# Patient Record
Sex: MALE | Race: WHITE | NOT HISPANIC OR LATINO | URBAN - METROPOLITAN AREA
[De-identification: names, ages, dates, MRNs, and addresses within clinical notes are randomized per-mention and may not be internally consistent; named-entity substitution may affect disease eponyms.]

---

## 2019-08-13 ENCOUNTER — OFFICE (OUTPATIENT)
Dept: URBAN - METROPOLITAN AREA CLINIC 11 | Facility: CLINIC | Age: 39
End: 2019-08-13
Payer: COMMERCIAL

## 2019-08-13 VITALS
WEIGHT: 294 LBS | HEIGHT: 73 IN | DIASTOLIC BLOOD PRESSURE: 81 MMHG | SYSTOLIC BLOOD PRESSURE: 127 MMHG | HEART RATE: 80 BPM

## 2019-08-13 DIAGNOSIS — R12 HEARTBURN: ICD-10-CM

## 2019-08-13 DIAGNOSIS — R14.0 ABDOMINAL DISTENSION (GASEOUS): ICD-10-CM

## 2019-08-13 DIAGNOSIS — R19.4 CHANGE IN BOWEL HABIT: ICD-10-CM

## 2019-08-13 DIAGNOSIS — K76.0 FATTY (CHANGE OF) LIVER, NOT ELSEWHERE CLASSIFIED: ICD-10-CM

## 2019-08-13 DIAGNOSIS — R10.84 GENERALIZED ABDOMINAL PAIN: ICD-10-CM

## 2019-08-13 PROCEDURE — 99203 OFFICE O/P NEW LOW 30 MIN: CPT

## 2019-08-13 NOTE — SERVICEHPINOTES
38-year-old white male with several month history of increased abdominal bloating, eructation, abdominal pain. For the past few weeks, it has been more severe. The bloating progresses throughout the day, and is associated with diffuse abdominal pain. The abdominal pain can be so severe that it "feels like my insides are exploding" and could also radiate to his low back. The pain will have mild worsening with food and mild improvement with a bowel movement. He had one episode of severe pain with associated diaphoresis and nausea that resulted in a panic attack and resulted in an ER visit. He was prescribed dicyclomine with worsening of discomfort. He tried Gas-X and Pepto-Bismol with equivocal response. There is also a change in bowel pattern from 2-3 formed bowel movements daily to 4-5 bowel movements per day with less volume and be unformed.  Ultrasound was done 7/2/19 that showed fatty liver but otherwise was normal. HIDA was done 7/3/19 that was normal with an EF of 77%. CMP revealed elevated AST to 48 and ALT to 52. His fasting glucose was normal. Amylase, Lipase, CBC, and urinalysis were normal. He also has daily pyrosis and occasional regurgitation and eructation. He denies dysphagia. He started pantoprazole 40 mg  about 1.5 months ago with equivocal response. He denies dysphagia or odynophagia.With respect to fatty liver, he does not currently exercise. His diet is significant for fast food, restaurant food, and meals of convenience. He has significant alcohol intake of 6 beers per day, most days of the week. He is aware he needs to lose weight.

## 2019-08-13 NOTE — SERVICENOTES
Increased bloating and abdominal pain. Differential includes small intestinal bacterial overgrowth, irritable bowel syndrome, or less likely lactose malabsorption as he seldomly consumes dairy. There may be a component of aerophagia. The patient was previously not taking the Gas-X on a scheduled or consistent basis, so he will do an empiric trial of scheduled Gas-X four times a day. Lactulose Breath test scheduled for 8/21/19.

## 2019-08-21 ENCOUNTER — OFFICE (OUTPATIENT)
Dept: URBAN - METROPOLITAN AREA CLINIC 14 | Facility: CLINIC | Age: 39
End: 2019-08-21
Payer: COMMERCIAL

## 2019-08-21 DIAGNOSIS — R14.0 ABDOMINAL DISTENSION (GASEOUS): ICD-10-CM

## 2019-08-21 PROCEDURE — 91065 BREATH HYDROGEN/METHANE TEST: CPT

## 2019-08-22 ENCOUNTER — AMBULATORY SURGICAL CENTER (OUTPATIENT)
Dept: URBAN - METROPOLITAN AREA SURGERY 3 | Facility: SURGERY | Age: 39
End: 2019-08-22
Payer: COMMERCIAL

## 2019-08-22 ENCOUNTER — OFFICE (OUTPATIENT)
Dept: URBAN - METROPOLITAN AREA PATHOLOGY 22 | Facility: PATHOLOGY | Age: 39
End: 2019-08-22
Payer: COMMERCIAL

## 2019-08-22 ENCOUNTER — AMBULATORY SURGICAL CENTER (OUTPATIENT)
Dept: URBAN - METROPOLITAN AREA SURGERY 3 | Facility: SURGERY | Age: 39
End: 2019-08-22

## 2019-08-22 VITALS
DIASTOLIC BLOOD PRESSURE: 77 MMHG | SYSTOLIC BLOOD PRESSURE: 126 MMHG | SYSTOLIC BLOOD PRESSURE: 146 MMHG | RESPIRATION RATE: 18 BRPM | SYSTOLIC BLOOD PRESSURE: 118 MMHG | OXYGEN SATURATION: 93 % | SYSTOLIC BLOOD PRESSURE: 135 MMHG | OXYGEN SATURATION: 96 % | DIASTOLIC BLOOD PRESSURE: 71 MMHG | OXYGEN SATURATION: 98 % | WEIGHT: 290 LBS | RESPIRATION RATE: 21 BRPM | HEART RATE: 99 BPM | TEMPERATURE: 98.7 F | DIASTOLIC BLOOD PRESSURE: 96 MMHG | RESPIRATION RATE: 16 BRPM | SYSTOLIC BLOOD PRESSURE: 139 MMHG | HEART RATE: 98 BPM | HEART RATE: 88 BPM | RESPIRATION RATE: 16 BRPM | HEART RATE: 92 BPM | OXYGEN SATURATION: 98 % | DIASTOLIC BLOOD PRESSURE: 96 MMHG | DIASTOLIC BLOOD PRESSURE: 76 MMHG | SYSTOLIC BLOOD PRESSURE: 126 MMHG | HEART RATE: 92 BPM | SYSTOLIC BLOOD PRESSURE: 118 MMHG | SYSTOLIC BLOOD PRESSURE: 146 MMHG | RESPIRATION RATE: 21 BRPM | OXYGEN SATURATION: 98 % | DIASTOLIC BLOOD PRESSURE: 71 MMHG | OXYGEN SATURATION: 93 % | OXYGEN SATURATION: 96 % | OXYGEN SATURATION: 93 % | SYSTOLIC BLOOD PRESSURE: 118 MMHG | DIASTOLIC BLOOD PRESSURE: 96 MMHG | HEART RATE: 92 BPM | HEART RATE: 98 BPM | WEIGHT: 290 LBS | TEMPERATURE: 98.7 F | TEMPERATURE: 98.7 F | RESPIRATION RATE: 18 BRPM | RESPIRATION RATE: 24 BRPM | OXYGEN SATURATION: 92 % | SYSTOLIC BLOOD PRESSURE: 139 MMHG | WEIGHT: 290 LBS | HEART RATE: 88 BPM | DIASTOLIC BLOOD PRESSURE: 71 MMHG | OXYGEN SATURATION: 92 % | HEIGHT: 73 IN | TEMPERATURE: 98.1 F | RESPIRATION RATE: 24 BRPM | DIASTOLIC BLOOD PRESSURE: 76 MMHG | DIASTOLIC BLOOD PRESSURE: 54 MMHG | DIASTOLIC BLOOD PRESSURE: 54 MMHG | OXYGEN SATURATION: 92 % | HEART RATE: 96 BPM | DIASTOLIC BLOOD PRESSURE: 77 MMHG | SYSTOLIC BLOOD PRESSURE: 135 MMHG | TEMPERATURE: 98.1 F | DIASTOLIC BLOOD PRESSURE: 77 MMHG | SYSTOLIC BLOOD PRESSURE: 135 MMHG | HEART RATE: 98 BPM | HEIGHT: 73 IN | HEART RATE: 99 BPM | DIASTOLIC BLOOD PRESSURE: 76 MMHG | TEMPERATURE: 98.1 F | HEART RATE: 99 BPM | RESPIRATION RATE: 16 BRPM | DIASTOLIC BLOOD PRESSURE: 54 MMHG | RESPIRATION RATE: 18 BRPM | RESPIRATION RATE: 24 BRPM | SYSTOLIC BLOOD PRESSURE: 139 MMHG | HEART RATE: 96 BPM | HEART RATE: 96 BPM | SYSTOLIC BLOOD PRESSURE: 146 MMHG | OXYGEN SATURATION: 96 % | HEIGHT: 73 IN | SYSTOLIC BLOOD PRESSURE: 126 MMHG | HEART RATE: 88 BPM | RESPIRATION RATE: 21 BRPM

## 2019-08-22 DIAGNOSIS — K44.9 DIAPHRAGMATIC HERNIA WITHOUT OBSTRUCTION OR GANGRENE: ICD-10-CM

## 2019-08-22 DIAGNOSIS — R10.84 GENERALIZED ABDOMINAL PAIN: ICD-10-CM

## 2019-08-22 DIAGNOSIS — R14.0 ABDOMINAL DISTENSION (GASEOUS): ICD-10-CM

## 2019-08-22 DIAGNOSIS — K21.9 GASTRO-ESOPHAGEAL REFLUX DISEASE WITHOUT ESOPHAGITIS: ICD-10-CM

## 2019-08-22 DIAGNOSIS — I10 ESSENTIAL (PRIMARY) HYPERTENSION: ICD-10-CM

## 2019-08-22 DIAGNOSIS — K31.89 OTHER DISEASES OF STOMACH AND DUODENUM: ICD-10-CM

## 2019-08-22 PROCEDURE — 88305 TISSUE EXAM BY PATHOLOGIST: CPT

## 2019-08-22 PROCEDURE — 88313 SPECIAL STAINS GROUP 2: CPT

## 2019-08-22 PROCEDURE — 43239 EGD BIOPSY SINGLE/MULTIPLE: CPT

## 2019-08-22 PROCEDURE — 88342 IMHCHEM/IMCYTCHM 1ST ANTB: CPT

## 2019-09-06 ENCOUNTER — OFFICE (OUTPATIENT)
Dept: URBAN - METROPOLITAN AREA CLINIC 11 | Facility: CLINIC | Age: 39
End: 2019-09-06

## 2019-09-06 VITALS
SYSTOLIC BLOOD PRESSURE: 134 MMHG | HEART RATE: 74 BPM | DIASTOLIC BLOOD PRESSURE: 87 MMHG | HEIGHT: 73 IN | WEIGHT: 296 LBS

## 2019-09-06 DIAGNOSIS — R14.0 ABDOMINAL DISTENSION (GASEOUS): ICD-10-CM

## 2019-09-06 DIAGNOSIS — R19.4 CHANGE IN BOWEL HABIT: ICD-10-CM

## 2019-09-06 LAB
C-REACTIVE PROTEIN, QUANT: <1 MG/L
CBC, PLATELET, NO DIFFERENTIAL: HEMATOCRIT: 44.9 % (ref 37.5–51)
CBC, PLATELET, NO DIFFERENTIAL: HEMOGLOBIN: 15.3 G/DL (ref 13–17.7)
CBC, PLATELET, NO DIFFERENTIAL: MCH: 31.9 PG (ref 26.6–33)
CBC, PLATELET, NO DIFFERENTIAL: MCHC: 34.1 G/DL (ref 31.5–35.7)
CBC, PLATELET, NO DIFFERENTIAL: MCV: 94 FL (ref 79–97)
CBC, PLATELET, NO DIFFERENTIAL: PLATELETS: 286 X10E3/UL (ref 150–450)
CBC, PLATELET, NO DIFFERENTIAL: RBC: 4.8 X10E6/UL (ref 4.14–5.8)
CBC, PLATELET, NO DIFFERENTIAL: RDW: 13.1 % (ref 12.3–15.4)
CBC, PLATELET, NO DIFFERENTIAL: WBC: 7.5 X10E3/UL (ref 3.4–10.8)
COMP. METABOLIC PANEL (14): A/G RATIO: 1.3 (ref 1.2–2.2)
COMP. METABOLIC PANEL (14): ALBUMIN: 4.1 G/DL (ref 3.5–5.5)
COMP. METABOLIC PANEL (14): ALKALINE PHOSPHATASE: 105 IU/L (ref 39–117)
COMP. METABOLIC PANEL (14): ALT (SGPT): 56 IU/L — HIGH (ref 0–44)
COMP. METABOLIC PANEL (14): AST (SGOT): 42 IU/L — HIGH (ref 0–40)
COMP. METABOLIC PANEL (14): BILIRUBIN, TOTAL: 0.7 MG/DL (ref 0–1.2)
COMP. METABOLIC PANEL (14): BUN/CREATININE RATIO: 17 (ref 9–20)
COMP. METABOLIC PANEL (14): BUN: 11 MG/DL (ref 6–24)
COMP. METABOLIC PANEL (14): CALCIUM: 9.3 MG/DL (ref 8.7–10.2)
COMP. METABOLIC PANEL (14): CARBON DIOXIDE, TOTAL: 20 MMOL/L (ref 20–29)
COMP. METABOLIC PANEL (14): CHLORIDE: 104 MMOL/L (ref 96–106)
COMP. METABOLIC PANEL (14): CREATININE: 0.64 MG/DL — LOW (ref 0.76–1.27)
COMP. METABOLIC PANEL (14): EGFR IF AFRICN AM: 142 ML/MIN/1.73 (ref 59–?)
COMP. METABOLIC PANEL (14): EGFR IF NONAFRICN AM: 122 ML/MIN/1.73 (ref 59–?)
COMP. METABOLIC PANEL (14): GLOBULIN, TOTAL: 3.1 G/DL (ref 1.5–4.5)
COMP. METABOLIC PANEL (14): GLUCOSE: 91 MG/DL (ref 65–99)
COMP. METABOLIC PANEL (14): POTASSIUM: 4 MMOL/L (ref 3.5–5.2)
COMP. METABOLIC PANEL (14): PROTEIN, TOTAL: 7.2 G/DL (ref 6–8.5)
COMP. METABOLIC PANEL (14): SODIUM: 139 MMOL/L (ref 134–144)
IMMUNOGLOBULIN A, QN, SERUM: 382 MG/DL (ref 90–386)
T-TRANSGLUTAMINASE (TTG) IGG: <2 U/ML

## 2019-09-06 PROCEDURE — 99213 OFFICE O/P EST LOW 20 MIN: CPT

## 2019-09-12 ENCOUNTER — OFFICE (OUTPATIENT)
Dept: URBAN - METROPOLITAN AREA CLINIC 11 | Facility: CLINIC | Age: 39
End: 2019-09-12
Payer: COMMERCIAL

## 2019-09-12 VITALS
WEIGHT: 294 LBS | SYSTOLIC BLOOD PRESSURE: 141 MMHG | DIASTOLIC BLOOD PRESSURE: 93 MMHG | HEART RATE: 69 BPM | HEIGHT: 73 IN

## 2019-09-12 DIAGNOSIS — R10.9 UNSPECIFIED ABDOMINAL PAIN: ICD-10-CM

## 2019-09-12 DIAGNOSIS — R10.30 LOWER ABDOMINAL PAIN, UNSPECIFIED: ICD-10-CM

## 2019-09-12 DIAGNOSIS — R14.0 ABDOMINAL DISTENSION (GASEOUS): ICD-10-CM

## 2019-09-12 PROCEDURE — 99214 OFFICE O/P EST MOD 30 MIN: CPT

## 2019-09-12 RX ORDER — HYOSCYAMINE SULFATE 0.12 MG/1
TABLET ORAL; SUBLINGUAL
Qty: 60 | Refills: 0 | Status: COMPLETED
Start: 2019-09-12 | End: 2020-03-04

## 2019-09-12 NOTE — SERVICENOTES
I discussed that his bloating and pain may be due to inadequately treated SIBO, or alternate causes include diverticulitis, intestinal spasms, severe IBS. In light of his acute worsening of his pain, I discussed doing a CT now. He elected to wait until early next week due to work conflict. I emphasized the importance of going to the ER if his symptoms worsen. I empirically started him on hyoscyamine

## 2019-09-12 NOTE — SERVICEHPINOTES
Initial visit 8/13/19:BR 38-year-old white male with several month history of increased abdominal bloating, eructation, abdominal pain. The bloating progresses throughout the day, and is associated with diffuse abdominal pain. The abdominal pain can be so severe that it "feels like my insides are exploding" and could also radiate to his low back. The pain will have mild worsening with food and mild improvement with a bowel movement. He had one episode of severe pain with associated diaphoresis and nausea that resulted in a panic attack and resulted in an ER visit. He was prescribed dicyclomine with worsening of discomfort. He tried Gas-X and Pepto-Bismol with equivocal response. There is also a change in bowel pattern from 2-3 formed bowel movements daily to 4-5 bowel movements per day with less volume and be unformed. Ultrasound was done 7/2/19 that showed fatty liver but otherwise was normal. HIDA was done 7/3/19 that was normal with an EF of 77%. CMP revealed elevated AST to 48 and ALT to 52. His fasting glucose was normal. Amylase, Lipase, CBC, and urinalysis were normal. Lactulose breath test 8/22/19: increased methane, treated with neomycin and xifaxanEGD 8/22/19: no significant findings. H. pylori negative Interval history 9/6/19:the patient continues to get bloating and abdominal discomfort, particularly in the right lower abdomen. Believes he had transient improvement for couple of days after taking antibiotics for small intestinal bacterial overgrowth. However, he had to stop the antibiotics after 7 days because of lightheadedness and dizziness, symptoms associated with Xifaxan.Adilene continues to have intermittent nata-colored stoolsInterval history 9/12/19:BRThe patient returns today for acute worsening of the pain, since 9/8. It is rated at a 9 out of 10 and he has considered going to the ER. The pain is a continuous ache with intermittent fluctuations in intensity.  No fevers, chills, rectal bleeding. Loose stool continues. The pain is mostly located in the right lower abdomen and right flank with radiation across the lower abdomen or low back. No urinary symptoms. No significant change in pain with eating or defecation. The pain has mild improvement with a heating pad and lying down at night.He has transient improvement in bloating with Gas-X.   BR

## 2019-09-19 ENCOUNTER — OFFICE (OUTPATIENT)
Dept: URBAN - METROPOLITAN AREA CLINIC 11 | Facility: CLINIC | Age: 39
End: 2019-09-19
Payer: COMMERCIAL

## 2019-09-19 DIAGNOSIS — R14.0 ABDOMINAL DISTENSION (GASEOUS): ICD-10-CM

## 2019-09-19 PROCEDURE — 91065 BREATH HYDROGEN/METHANE TEST: CPT

## 2019-09-20 ENCOUNTER — OFFICE (OUTPATIENT)
Dept: URBAN - METROPOLITAN AREA CLINIC 11 | Facility: CLINIC | Age: 39
End: 2019-09-20

## 2019-09-20 ENCOUNTER — OFFICE (OUTPATIENT)
Dept: URBAN - METROPOLITAN AREA CLINIC 22 | Facility: CLINIC | Age: 39
End: 2019-09-20
Payer: COMMERCIAL

## 2019-09-20 DIAGNOSIS — R14.0 ABDOMINAL DISTENSION (GASEOUS): ICD-10-CM

## 2019-09-20 DIAGNOSIS — R10.30 LOWER ABDOMINAL PAIN, UNSPECIFIED: ICD-10-CM

## 2019-09-20 DIAGNOSIS — R10.9 UNSPECIFIED ABDOMINAL PAIN: ICD-10-CM

## 2019-09-20 LAB
CRYPTOSPORIDIUM EIA: NEGATIVE
FECAL FAT, QUALITATIVE: FATS, NEUTRAL: NORMAL
FECAL FAT, QUALITATIVE: FATS, TOTAL: NORMAL
GIARDIA LAMBLIA AG, EIA: NEGATIVE
PANCREATIC ELASTASE, FECAL: >500 UG ELAST./G
RESULT: RESULT 1: (no result)
WHITE BLOOD CELLS (WBC), STOOL: (no result)

## 2019-09-20 PROCEDURE — 74177 CT ABD & PELVIS W/CONTRAST: CPT

## 2019-12-09 ENCOUNTER — OFFICE (OUTPATIENT)
Dept: URBAN - METROPOLITAN AREA PATHOLOGY 22 | Facility: PATHOLOGY | Age: 39
End: 2019-12-09
Payer: COMMERCIAL

## 2019-12-09 ENCOUNTER — AMBULATORY SURGICAL CENTER (OUTPATIENT)
Dept: URBAN - METROPOLITAN AREA SURGERY 3 | Facility: SURGERY | Age: 39
End: 2019-12-09
Payer: COMMERCIAL

## 2019-12-09 VITALS
SYSTOLIC BLOOD PRESSURE: 156 MMHG | HEART RATE: 94 BPM | SYSTOLIC BLOOD PRESSURE: 119 MMHG | RESPIRATION RATE: 20 BRPM | RESPIRATION RATE: 16 BRPM | HEART RATE: 90 BPM | OXYGEN SATURATION: 94 % | SYSTOLIC BLOOD PRESSURE: 160 MMHG | HEART RATE: 93 BPM | SYSTOLIC BLOOD PRESSURE: 147 MMHG | OXYGEN SATURATION: 93 % | RESPIRATION RATE: 20 BRPM | RESPIRATION RATE: 20 BRPM | OXYGEN SATURATION: 97 % | DIASTOLIC BLOOD PRESSURE: 91 MMHG | HEART RATE: 93 BPM | SYSTOLIC BLOOD PRESSURE: 157 MMHG | OXYGEN SATURATION: 94 % | OXYGEN SATURATION: 95 % | HEIGHT: 73 IN | SYSTOLIC BLOOD PRESSURE: 119 MMHG | SYSTOLIC BLOOD PRESSURE: 157 MMHG | DIASTOLIC BLOOD PRESSURE: 17 MMHG | SYSTOLIC BLOOD PRESSURE: 119 MMHG | DIASTOLIC BLOOD PRESSURE: 91 MMHG | HEART RATE: 94 BPM | OXYGEN SATURATION: 95 % | DIASTOLIC BLOOD PRESSURE: 17 MMHG | RESPIRATION RATE: 16 BRPM | OXYGEN SATURATION: 95 % | TEMPERATURE: 93 F | HEART RATE: 95 BPM | HEART RATE: 95 BPM | OXYGEN SATURATION: 94 % | DIASTOLIC BLOOD PRESSURE: 90 MMHG | DIASTOLIC BLOOD PRESSURE: 95 MMHG | RESPIRATION RATE: 18 BRPM | DIASTOLIC BLOOD PRESSURE: 95 MMHG | DIASTOLIC BLOOD PRESSURE: 78 MMHG | RESPIRATION RATE: 16 BRPM | HEART RATE: 95 BPM | SYSTOLIC BLOOD PRESSURE: 160 MMHG | OXYGEN SATURATION: 97 % | SYSTOLIC BLOOD PRESSURE: 147 MMHG | OXYGEN SATURATION: 98.2 % | DIASTOLIC BLOOD PRESSURE: 78 MMHG | HEART RATE: 94 BPM | OXYGEN SATURATION: 98.2 % | TEMPERATURE: 97.3 F | DIASTOLIC BLOOD PRESSURE: 17 MMHG | SYSTOLIC BLOOD PRESSURE: 160 MMHG | SYSTOLIC BLOOD PRESSURE: 157 MMHG | WEIGHT: 290 LBS | SYSTOLIC BLOOD PRESSURE: 147 MMHG | HEART RATE: 93 BPM | OXYGEN SATURATION: 93 % | RESPIRATION RATE: 18 BRPM | OXYGEN SATURATION: 93 % | TEMPERATURE: 97.3 F | RESPIRATION RATE: 18 BRPM | TEMPERATURE: 97.3 F | DIASTOLIC BLOOD PRESSURE: 95 MMHG | TEMPERATURE: 93 F | HEIGHT: 73 IN | WEIGHT: 290 LBS | OXYGEN SATURATION: 98.2 % | DIASTOLIC BLOOD PRESSURE: 91 MMHG | SYSTOLIC BLOOD PRESSURE: 156 MMHG | DIASTOLIC BLOOD PRESSURE: 90 MMHG | HEART RATE: 90 BPM | DIASTOLIC BLOOD PRESSURE: 90 MMHG | DIASTOLIC BLOOD PRESSURE: 78 MMHG | HEART RATE: 90 BPM | WEIGHT: 290 LBS | SYSTOLIC BLOOD PRESSURE: 156 MMHG | OXYGEN SATURATION: 97 % | HEIGHT: 73 IN | TEMPERATURE: 93 F

## 2019-12-09 DIAGNOSIS — K62.0 ANAL POLYP: ICD-10-CM

## 2019-12-09 DIAGNOSIS — K57.30 DIVERTICULOSIS OF LARGE INTESTINE WITHOUT PERFORATION OR ABS: ICD-10-CM

## 2019-12-09 DIAGNOSIS — K62.6 ULCER OF ANUS AND RECTUM: ICD-10-CM

## 2019-12-09 DIAGNOSIS — R19.7 DIARRHEA, UNSPECIFIED: ICD-10-CM

## 2019-12-09 DIAGNOSIS — D12.5 BENIGN NEOPLASM OF SIGMOID COLON: ICD-10-CM

## 2019-12-09 PROCEDURE — 45385 COLONOSCOPY W/LESION REMOVAL: CPT

## 2019-12-09 PROCEDURE — 88305 TISSUE EXAM BY PATHOLOGIST: CPT

## 2019-12-09 PROCEDURE — 88342 IMHCHEM/IMCYTCHM 1ST ANTB: CPT

## 2019-12-09 PROCEDURE — 45380 COLONOSCOPY AND BIOPSY: CPT | Mod: 59

## 2020-03-04 ENCOUNTER — OFFICE (OUTPATIENT)
Dept: URBAN - METROPOLITAN AREA CLINIC 11 | Facility: CLINIC | Age: 40
End: 2020-03-04

## 2020-03-04 VITALS
DIASTOLIC BLOOD PRESSURE: 83 MMHG | HEIGHT: 73 IN | SYSTOLIC BLOOD PRESSURE: 131 MMHG | WEIGHT: 262 LBS | HEART RATE: 81 BPM

## 2020-03-04 DIAGNOSIS — R10.11 RIGHT UPPER QUADRANT PAIN: ICD-10-CM

## 2020-03-04 DIAGNOSIS — R74.8 ABNORMAL LEVELS OF OTHER SERUM ENZYMES: ICD-10-CM

## 2020-03-04 DIAGNOSIS — R10.31 RIGHT LOWER QUADRANT PAIN: ICD-10-CM

## 2020-03-04 DIAGNOSIS — R11.2 NAUSEA WITH VOMITING, UNSPECIFIED: ICD-10-CM

## 2020-03-04 PROCEDURE — 99214 OFFICE O/P EST MOD 30 MIN: CPT

## 2020-03-04 RX ORDER — HYOSCYAMINE SULFATE 0.12 MG/1
0.5 TABLET ORAL; SUBLINGUAL
Qty: 120 | Refills: 1 | Status: COMPLETED
Start: 2020-03-04 | End: 2020-10-08

## 2020-03-18 ENCOUNTER — OFFICE (OUTPATIENT)
Dept: URBAN - METROPOLITAN AREA CLINIC 11 | Facility: CLINIC | Age: 40
End: 2020-03-18

## 2020-06-08 ENCOUNTER — OFFICE (OUTPATIENT)
Dept: URBAN - METROPOLITAN AREA CLINIC 11 | Facility: CLINIC | Age: 40
End: 2020-06-08

## 2020-06-10 ENCOUNTER — OFFICE (OUTPATIENT)
Dept: URBAN - METROPOLITAN AREA CLINIC 11 | Facility: CLINIC | Age: 40
End: 2020-06-10

## 2020-06-10 VITALS
WEIGHT: 269 LBS | DIASTOLIC BLOOD PRESSURE: 74 MMHG | HEIGHT: 73 IN | SYSTOLIC BLOOD PRESSURE: 119 MMHG | HEART RATE: 89 BPM

## 2020-06-10 DIAGNOSIS — K21.9 GASTRO-ESOPHAGEAL REFLUX DISEASE WITHOUT ESOPHAGITIS: ICD-10-CM

## 2020-06-10 DIAGNOSIS — R10.9 UNSPECIFIED ABDOMINAL PAIN: ICD-10-CM

## 2020-06-10 DIAGNOSIS — R10.11 RIGHT UPPER QUADRANT PAIN: ICD-10-CM

## 2020-06-10 DIAGNOSIS — R11.2 NAUSEA WITH VOMITING, UNSPECIFIED: ICD-10-CM

## 2020-06-10 DIAGNOSIS — R10.31 RIGHT LOWER QUADRANT PAIN: ICD-10-CM

## 2020-06-10 DIAGNOSIS — R19.7 DIARRHEA, UNSPECIFIED: ICD-10-CM

## 2020-06-10 DIAGNOSIS — R74.8 ABNORMAL LEVELS OF OTHER SERUM ENZYMES: ICD-10-CM

## 2020-06-10 LAB
GGT: 374 IU/L — HIGH (ref 0–65)
HEP A AB, TOTAL: NEGATIVE
HEP B SURFACE AB: HEP B SURFACE AB, QUAL: NON REACTIVE
HEPATIC FUNCTION PANEL (7): ALBUMIN: 3.9 G/DL — LOW (ref 4–5)
HEPATIC FUNCTION PANEL (7): ALKALINE PHOSPHATASE: 110 IU/L (ref 39–117)
HEPATIC FUNCTION PANEL (7): ALT (SGPT): 135 IU/L — HIGH (ref 0–44)
HEPATIC FUNCTION PANEL (7): AST (SGOT): 171 IU/L — HIGH (ref 0–40)
HEPATIC FUNCTION PANEL (7): BILIRUBIN, DIRECT: 0.25 MG/DL (ref 0–0.4)
HEPATIC FUNCTION PANEL (7): BILIRUBIN, TOTAL: 0.7 MG/DL (ref 0–1.2)
HEPATIC FUNCTION PANEL (7): PROTEIN, TOTAL: 6.6 G/DL (ref 6–8.5)
HEPATITIS PANEL (4): HBSAG SCREEN: NEGATIVE
HEPATITIS PANEL (4): HEP A AB, IGM: NEGATIVE
HEPATITIS PANEL (4): HEP B CORE AB, IGM: NEGATIVE
HEPATITIS PANEL (4): HEP C VIRUS AB: <0.1 S/CO RATIO
PROTHROMBIN TIME (PT): INR: 1 (ref 0.8–1.2)
PROTHROMBIN TIME (PT): PROTHROMBIN TIME: 10.7 SEC (ref 9.1–12)
REQUEST PROBLEM: (no result)
VITAMIN B1 (THIAMINE), BLOOD: VIT. B1, WHOLE BLOOD: (no result) NMOL/L
VITAMIN B12 AND FOLATE: FOLATE (FOLIC ACID), SERUM: 2.4 NG/ML — LOW (ref 3–?)
VITAMIN B12 AND FOLATE: VITAMIN B12: 1411 PG/ML — HIGH (ref 232–1245)

## 2020-06-10 PROCEDURE — 99214 OFFICE O/P EST MOD 30 MIN: CPT

## 2020-06-10 RX ORDER — HYOSCYAMINE SULFATE 0.12 MG/1
0.5 TABLET ORAL; SUBLINGUAL
Qty: 120 | Refills: 1 | Status: COMPLETED
Start: 2020-03-04 | End: 2020-10-08

## 2020-06-10 RX ORDER — LANSOPRAZOLE 30 MG/1
30 CAPSULE, DELAYED RELEASE PELLETS ORAL
Qty: 30 | Refills: 2 | Status: COMPLETED
Start: 2020-06-10 | End: 2020-10-08

## 2020-06-10 RX ORDER — PANTOPRAZOLE SODIUM 40 MG/1
40 TABLET, DELAYED RELEASE ORAL
Qty: 30 | Refills: 0 | Status: COMPLETED
End: 2020-06-10

## 2020-06-10 NOTE — INTERFACERESULTNOTES
Results provided via portal (thiamine still pending). Recommended he start OTC supplement Folic acid 1mg once daily for 3 months. Also needs twinrix, can start when here for fibroscan next month.

## 2020-06-10 NOTE — SERVICEHPINOTES
Mr. Hardin is a very pleasant 40 y/o WM who presents today for evaluation of acute change in his liver enzymes. He has a history of mildly elevated liver enzymes, AST 40s, ALT 50s for at least a year. He also has fatty liver on imaging. There was modest increase in liver enzymes on 3/18/2020 with AST 59, , and . T bili was normal at 0.4. Liver enzymes were in the 50s last month per patient I have requested results. On repeat labs 6/8/2020, there was increase in ,  with a total bilirubin of 2.9/direct bili 0.6. Alk Phos normal at 116. He denies recent jaundice and there is no scleral icterus or jaundice on exam today. No recent fevers. He had a single episode of chills on a day where he had only a single beer he was unsure whether it was due to alcohol withdrawal versus anxiety.  While he has excessive alcohol intake, he admits to no increase in alcohol intake over the past few months. On average he has 5 beers daily. There have been days where he has only had a single beer. On days of more stress he will have up to 15 beers. He has not had any complications related to alcohol abuse, including no DUI, pancreatitis, work-related events. There is significant psychosocial issues he is working through - he is recently  and now has shared custody of his children. He is about to lose his insurance. He has depression. He is currently seeing a psychologist (most recent appointment this morning), and will start seeing a psychiatrist (first appointment this afternoon). He does have family support locally with his mother. He seems motivated to reduce his alcohol intake for his children and to prevent further damage to his liver. He also has chronic GERD. He admits to significant Pepto-Bismol use at times. He has run out of his Pantoprazole 40mg. He also has acute diarrhea that started about 2 weeks ago, shortly after completing a short-course of azithromycin. In the past week, he will have more than 10 episodes of diarrhea daily that are typically yellow in color. No melena or hematochezia. There is associated mild lower abdominal discomfort. His appetite is decreased, in part to the psychosocial issues he is dealing with, and he states on days he eats more and drinks less, his stool is closer to normal. He also has nausea and vomiting. It occurs in no particular pattern. There are 5-10 episodes per week. No hematemesis. He continues to have the abdominal pain that he previously had undergone evaluation. It begins in his right lower abdomen just left of the anterior axillary line with radiation to the right upper quadrant and right flank. It can radiate to the right side of his back. For the past couple of months it can radiate to his right lateral chest.  It can last for hours and is described as stabbing. It is not associated with meals or bowel movement. If he stretches to the left he can sometimes feel a "pop", which provides transient relief in the upper pain. He has tried biofreeze to the affected areas with no improvement. There is no improvement with hyoscyamine, though he admits the hyoscyamine seems to "settle his stomach". He has had extensive evaluation including CT, abdominal ultrasound, HIDA with EF, EGD with no findings to explain the pain.  Impression/Plan:BR-The visit today focused on his acute increase in his liver enzymes along with mild hyperbilirubinemia. This raises the concern for possible development of acute alcoholic hepatitis. This patient clearly has excessive alcohol intake with extensive psychosocial complexity. I discussed if he continues his current level of alcohol consumption he is at increased risk of progression to cirrhosis. He seems motivated to decrease his alcohol intake. BR-Acute diarrhea shortly after antibiotic use suggestive of c difficile infection. BR-Abdominal pain in a nonspecific pattern. Evaluation to date with no findings to explain the pain. This may be functional. BR-Nausea and vomiting raises extensive differential including gastritis secondary to alcohol intake, delayed gastric emptying. BR

## 2020-06-16 ENCOUNTER — OFFICE (OUTPATIENT)
Dept: URBAN - METROPOLITAN AREA CLINIC 11 | Facility: CLINIC | Age: 40
End: 2020-06-16
Payer: COMMERCIAL

## 2020-06-16 VITALS
HEIGHT: 73 IN | HEART RATE: 71 BPM | SYSTOLIC BLOOD PRESSURE: 126 MMHG | WEIGHT: 270 LBS | DIASTOLIC BLOOD PRESSURE: 79 MMHG

## 2020-06-16 DIAGNOSIS — R74.8 ABNORMAL LEVELS OF OTHER SERUM ENZYMES: ICD-10-CM

## 2020-06-16 DIAGNOSIS — Z23 ENCOUNTER FOR IMMUNIZATION: ICD-10-CM

## 2020-06-16 LAB — VITAMIN B1 (THIAMINE), BLOOD: VIT. B1, WHOLE BLOOD: 138.9 NMOL/L (ref 66.5–200)

## 2020-06-16 PROCEDURE — 99213 OFFICE O/P EST LOW 20 MIN: CPT | Mod: 25

## 2020-06-16 PROCEDURE — 90636 HEP A/HEP B VACC ADULT IM: CPT

## 2020-06-16 PROCEDURE — 90471 IMMUNIZATION ADMIN: CPT

## 2020-06-16 NOTE — SERVICEHPINOTES
This patient has done well over the last week with decreased alcohol consumption except for yesterday when he had 5 years.  A couple of days he had no alcohol.[facility] liver enzymes were significant lower last week.[facility] psychiatrist has altered his medications.I discouraged him from trying be absent from alcohol for a whole day.  Rather, to continue 10% decreased per day, consistently.BRThiamine was not run.  Needs redrawn.  Will initiate viral hepatitis immunizationBR

## 2020-07-13 ENCOUNTER — OFFICE (OUTPATIENT)
Dept: URBAN - METROPOLITAN AREA CLINIC 14 | Facility: CLINIC | Age: 40
End: 2020-07-13
Payer: COMMERCIAL

## 2020-07-13 VITALS — HEIGHT: 73 IN

## 2020-07-13 DIAGNOSIS — R74.8 ABNORMAL LEVELS OF OTHER SERUM ENZYMES: ICD-10-CM

## 2020-07-13 PROCEDURE — 91200 LIVER ELASTOGRAPHY: CPT

## 2020-10-08 ENCOUNTER — OFFICE (OUTPATIENT)
Dept: URBAN - METROPOLITAN AREA CLINIC 11 | Facility: CLINIC | Age: 40
End: 2020-10-08
Payer: COMMERCIAL

## 2020-10-08 VITALS
WEIGHT: 266 LBS | DIASTOLIC BLOOD PRESSURE: 79 MMHG | SYSTOLIC BLOOD PRESSURE: 121 MMHG | HEIGHT: 73 IN | OXYGEN SATURATION: 99 % | HEART RATE: 74 BPM

## 2020-10-08 DIAGNOSIS — K76.0 FATTY (CHANGE OF) LIVER, NOT ELSEWHERE CLASSIFIED: ICD-10-CM

## 2020-10-08 LAB
HEPATIC FUNCTION PANEL (7): ALBUMIN: 3.6 G/DL — LOW (ref 4–5)
HEPATIC FUNCTION PANEL (7): ALKALINE PHOSPHATASE: 136 IU/L — HIGH (ref 39–117)
HEPATIC FUNCTION PANEL (7): ALT (SGPT): 194 IU/L — HIGH (ref 0–44)
HEPATIC FUNCTION PANEL (7): AST (SGOT): 152 IU/L — HIGH (ref 0–40)
HEPATIC FUNCTION PANEL (7): BILIRUBIN, DIRECT: 0.97 MG/DL — HIGH (ref 0–0.4)
HEPATIC FUNCTION PANEL (7): BILIRUBIN, TOTAL: 2.2 MG/DL — HIGH (ref 0–1.2)
HEPATIC FUNCTION PANEL (7): PROTEIN, TOTAL: 6.6 G/DL (ref 6–8.5)

## 2020-10-08 PROCEDURE — 99214 OFFICE O/P EST MOD 30 MIN: CPT

## 2020-10-08 NOTE — INTERFACERESULTNOTES
portal message to patient with results and recommendation to come in today or tomorrow for repeat labs. Need to recheck liver enzymes and bilirubin. Will check CBC, CMP, and PT/INR.

## 2021-09-14 ENCOUNTER — INPATIENT HOSPITAL (OUTPATIENT)
Dept: URBAN - METROPOLITAN AREA HOSPITAL 95 | Facility: HOSPITAL | Age: 41
End: 2021-09-14

## 2021-09-14 DIAGNOSIS — F10.121 ALCOHOL ABUSE WITH INTOXICATION DELIRIUM: ICD-10-CM

## 2021-09-14 DIAGNOSIS — K85.92 ACUTE PANCREATITIS WITH INFECTED NECROSIS, UNSPECIFIED: ICD-10-CM

## 2021-09-14 DIAGNOSIS — N17.9 ACUTE KIDNEY FAILURE, UNSPECIFIED: ICD-10-CM

## 2021-09-14 DIAGNOSIS — R94.5 ABNORMAL RESULTS OF LIVER FUNCTION STUDIES: ICD-10-CM

## 2021-09-14 PROCEDURE — 99254 IP/OBS CNSLTJ NEW/EST MOD 60: CPT | Performed by: INTERNAL MEDICINE

## 2021-09-15 ENCOUNTER — INPATIENT HOSPITAL (OUTPATIENT)
Dept: URBAN - METROPOLITAN AREA HOSPITAL 95 | Facility: HOSPITAL | Age: 41
End: 2021-09-15

## 2021-09-15 DIAGNOSIS — N17.9 ACUTE KIDNEY FAILURE, UNSPECIFIED: ICD-10-CM

## 2021-09-15 DIAGNOSIS — K85.92 ACUTE PANCREATITIS WITH INFECTED NECROSIS, UNSPECIFIED: ICD-10-CM

## 2021-09-15 DIAGNOSIS — F10.121 ALCOHOL ABUSE WITH INTOXICATION DELIRIUM: ICD-10-CM

## 2021-09-15 DIAGNOSIS — R94.5 ABNORMAL RESULTS OF LIVER FUNCTION STUDIES: ICD-10-CM

## 2021-09-15 PROCEDURE — 99232 SBSQ HOSP IP/OBS MODERATE 35: CPT | Performed by: INTERNAL MEDICINE

## 2021-09-16 ENCOUNTER — INPATIENT HOSPITAL (OUTPATIENT)
Dept: URBAN - METROPOLITAN AREA HOSPITAL 95 | Facility: HOSPITAL | Age: 41
End: 2021-09-16

## 2021-09-16 DIAGNOSIS — K85.90 ACUTE PANCREATITIS WITHOUT NECROSIS OR INFECTION, UNSPECIFIE: ICD-10-CM

## 2021-09-16 DIAGNOSIS — N17.9 ACUTE KIDNEY FAILURE, UNSPECIFIED: ICD-10-CM

## 2021-09-16 DIAGNOSIS — F10.121 ALCOHOL ABUSE WITH INTOXICATION DELIRIUM: ICD-10-CM

## 2021-09-16 DIAGNOSIS — R94.5 ABNORMAL RESULTS OF LIVER FUNCTION STUDIES: ICD-10-CM

## 2021-09-16 PROCEDURE — 99232 SBSQ HOSP IP/OBS MODERATE 35: CPT | Performed by: INTERNAL MEDICINE

## 2021-09-17 ENCOUNTER — INPATIENT HOSPITAL (OUTPATIENT)
Dept: URBAN - METROPOLITAN AREA HOSPITAL 95 | Facility: HOSPITAL | Age: 41
End: 2021-09-17

## 2021-09-17 DIAGNOSIS — F10.121 ALCOHOL ABUSE WITH INTOXICATION DELIRIUM: ICD-10-CM

## 2021-09-17 DIAGNOSIS — K85.90 ACUTE PANCREATITIS WITHOUT NECROSIS OR INFECTION, UNSPECIFIE: ICD-10-CM

## 2021-09-17 DIAGNOSIS — N17.9 ACUTE KIDNEY FAILURE, UNSPECIFIED: ICD-10-CM

## 2021-09-17 PROCEDURE — 99232 SBSQ HOSP IP/OBS MODERATE 35: CPT | Performed by: INTERNAL MEDICINE

## 2021-09-18 ENCOUNTER — INPATIENT HOSPITAL (OUTPATIENT)
Dept: URBAN - METROPOLITAN AREA HOSPITAL 95 | Facility: HOSPITAL | Age: 41
End: 2021-09-18

## 2021-09-18 DIAGNOSIS — N17.9 ACUTE KIDNEY FAILURE, UNSPECIFIED: ICD-10-CM

## 2021-09-18 DIAGNOSIS — K85.92 ACUTE PANCREATITIS WITH INFECTED NECROSIS, UNSPECIFIED: ICD-10-CM

## 2021-09-18 DIAGNOSIS — F10.121 ALCOHOL ABUSE WITH INTOXICATION DELIRIUM: ICD-10-CM

## 2021-09-18 DIAGNOSIS — R18.8 OTHER ASCITES: ICD-10-CM

## 2021-09-18 PROCEDURE — 99232 SBSQ HOSP IP/OBS MODERATE 35: CPT | Performed by: INTERNAL MEDICINE

## 2021-09-19 ENCOUNTER — INPATIENT HOSPITAL (OUTPATIENT)
Dept: URBAN - METROPOLITAN AREA HOSPITAL 95 | Facility: HOSPITAL | Age: 41
End: 2021-09-19

## 2021-09-19 DIAGNOSIS — F10.121 ALCOHOL ABUSE WITH INTOXICATION DELIRIUM: ICD-10-CM

## 2021-09-19 DIAGNOSIS — K85.92 ACUTE PANCREATITIS WITH INFECTED NECROSIS, UNSPECIFIED: ICD-10-CM

## 2021-09-19 DIAGNOSIS — N17.9 ACUTE KIDNEY FAILURE, UNSPECIFIED: ICD-10-CM

## 2021-09-19 DIAGNOSIS — R18.8 OTHER ASCITES: ICD-10-CM

## 2021-09-19 PROCEDURE — 99232 SBSQ HOSP IP/OBS MODERATE 35: CPT | Performed by: INTERNAL MEDICINE

## 2021-09-20 ENCOUNTER — INPATIENT HOSPITAL (OUTPATIENT)
Dept: URBAN - METROPOLITAN AREA HOSPITAL 95 | Facility: HOSPITAL | Age: 41
End: 2021-09-20

## 2021-09-20 DIAGNOSIS — F10.121 ALCOHOL ABUSE WITH INTOXICATION DELIRIUM: ICD-10-CM

## 2021-09-20 DIAGNOSIS — R94.5 ABNORMAL RESULTS OF LIVER FUNCTION STUDIES: ICD-10-CM

## 2021-09-20 DIAGNOSIS — K85.92 ACUTE PANCREATITIS WITH INFECTED NECROSIS, UNSPECIFIED: ICD-10-CM

## 2021-09-20 DIAGNOSIS — N17.9 ACUTE KIDNEY FAILURE, UNSPECIFIED: ICD-10-CM

## 2021-09-20 PROCEDURE — 99232 SBSQ HOSP IP/OBS MODERATE 35: CPT | Performed by: INTERNAL MEDICINE

## 2021-09-21 ENCOUNTER — INPATIENT HOSPITAL (OUTPATIENT)
Dept: URBAN - METROPOLITAN AREA HOSPITAL 95 | Facility: HOSPITAL | Age: 41
End: 2021-09-21

## 2021-09-21 DIAGNOSIS — K85.92 ACUTE PANCREATITIS WITH INFECTED NECROSIS, UNSPECIFIED: ICD-10-CM

## 2021-09-21 DIAGNOSIS — K70.11 ALCOHOLIC HEPATITIS WITH ASCITES: ICD-10-CM

## 2021-09-21 DIAGNOSIS — N17.9 ACUTE KIDNEY FAILURE, UNSPECIFIED: ICD-10-CM

## 2021-09-21 PROCEDURE — 99232 SBSQ HOSP IP/OBS MODERATE 35: CPT | Performed by: INTERNAL MEDICINE

## 2021-09-22 ENCOUNTER — INPATIENT HOSPITAL (OUTPATIENT)
Dept: URBAN - METROPOLITAN AREA HOSPITAL 95 | Facility: HOSPITAL | Age: 41
End: 2021-09-22

## 2021-09-22 DIAGNOSIS — N17.9 ACUTE KIDNEY FAILURE, UNSPECIFIED: ICD-10-CM

## 2021-09-22 DIAGNOSIS — R94.5 ABNORMAL RESULTS OF LIVER FUNCTION STUDIES: ICD-10-CM

## 2021-09-22 DIAGNOSIS — K85.92 ACUTE PANCREATITIS WITH INFECTED NECROSIS, UNSPECIFIED: ICD-10-CM

## 2021-09-22 DIAGNOSIS — K70.11 ALCOHOLIC HEPATITIS WITH ASCITES: ICD-10-CM

## 2021-09-22 PROCEDURE — 99231 SBSQ HOSP IP/OBS SF/LOW 25: CPT | Performed by: INTERNAL MEDICINE

## 2021-09-23 ENCOUNTER — INPATIENT HOSPITAL (OUTPATIENT)
Dept: URBAN - METROPOLITAN AREA HOSPITAL 95 | Facility: HOSPITAL | Age: 41
End: 2021-09-23

## 2021-09-23 DIAGNOSIS — K70.11 ALCOHOLIC HEPATITIS WITH ASCITES: ICD-10-CM

## 2021-09-23 DIAGNOSIS — N17.9 ACUTE KIDNEY FAILURE, UNSPECIFIED: ICD-10-CM

## 2021-09-23 DIAGNOSIS — K85.92 ACUTE PANCREATITIS WITH INFECTED NECROSIS, UNSPECIFIED: ICD-10-CM

## 2021-09-23 PROCEDURE — 99232 SBSQ HOSP IP/OBS MODERATE 35: CPT | Performed by: INTERNAL MEDICINE

## 2021-09-24 ENCOUNTER — INPATIENT HOSPITAL (OUTPATIENT)
Dept: URBAN - METROPOLITAN AREA HOSPITAL 95 | Facility: HOSPITAL | Age: 41
End: 2021-09-24

## 2021-09-24 DIAGNOSIS — K85.92 ACUTE PANCREATITIS WITH INFECTED NECROSIS, UNSPECIFIED: ICD-10-CM

## 2021-09-24 PROCEDURE — 99231 SBSQ HOSP IP/OBS SF/LOW 25: CPT | Performed by: INTERNAL MEDICINE

## 2021-09-29 ENCOUNTER — OFFICE (OUTPATIENT)
Dept: URBAN - METROPOLITAN AREA CLINIC 9 | Facility: CLINIC | Age: 41
End: 2021-09-29

## 2021-09-29 VITALS
OXYGEN SATURATION: 96 % | HEART RATE: 107 BPM | DIASTOLIC BLOOD PRESSURE: 85 MMHG | SYSTOLIC BLOOD PRESSURE: 129 MMHG | WEIGHT: 286 LBS | HEIGHT: 73 IN

## 2021-09-29 DIAGNOSIS — R19.7 DIARRHEA, UNSPECIFIED: ICD-10-CM

## 2021-09-29 DIAGNOSIS — R10.9 UNSPECIFIED ABDOMINAL PAIN: ICD-10-CM

## 2021-09-29 DIAGNOSIS — K70.10 ALCOHOLIC HEPATITIS WITHOUT ASCITES: ICD-10-CM

## 2021-09-29 DIAGNOSIS — K85.90 ACUTE PANCREATITIS WITHOUT NECROSIS OR INFECTION, UNSPECIFIE: ICD-10-CM

## 2021-09-29 PROBLEM — K52.89 CLINICALLY SIGNIFICANT DIARRHEA OF UNEXPLAINED ORIGIN: Status: ACTIVE | Noted: 2019-12-09

## 2021-09-29 LAB
HEPATIC FUNCTION PANEL (7): ALBUMIN: 3.7 G/DL — LOW (ref 4–5)
HEPATIC FUNCTION PANEL (7): ALKALINE PHOSPHATASE: 76 IU/L (ref 44–121)
HEPATIC FUNCTION PANEL (7): ALT (SGPT): 30 IU/L (ref 0–44)
HEPATIC FUNCTION PANEL (7): AST (SGOT): 37 IU/L (ref 0–40)
HEPATIC FUNCTION PANEL (7): BILIRUBIN, DIRECT: 0.26 MG/DL (ref 0–0.4)
HEPATIC FUNCTION PANEL (7): BILIRUBIN, TOTAL: 0.5 MG/DL (ref 0–1.2)
HEPATIC FUNCTION PANEL (7): PROTEIN, TOTAL: 6.5 G/DL (ref 6–8.5)
LIPASE: 220 U/L — HIGH (ref 13–78)
PROTHROMBIN TIME (PT): INR: 1 (ref 0.9–1.2)
PROTHROMBIN TIME (PT): PROTHROMBIN TIME: 10.7 SEC (ref 9.1–12)

## 2021-09-29 PROCEDURE — 99213 OFFICE O/P EST LOW 20 MIN: CPT | Performed by: INTERNAL MEDICINE

## 2023-03-14 ENCOUNTER — INPATIENT HOSPITAL (OUTPATIENT)
Dept: URBAN - METROPOLITAN AREA HOSPITAL 95 | Facility: HOSPITAL | Age: 43
End: 2023-03-14

## 2023-03-14 DIAGNOSIS — K85.92 ACUTE PANCREATITIS WITH INFECTED NECROSIS, UNSPECIFIED: ICD-10-CM

## 2023-03-14 DIAGNOSIS — N17.9 ACUTE KIDNEY FAILURE, UNSPECIFIED: ICD-10-CM

## 2023-03-14 PROCEDURE — 99222 1ST HOSP IP/OBS MODERATE 55: CPT | Mod: SA | Performed by: NURSE PRACTITIONER

## 2023-03-15 ENCOUNTER — INPATIENT HOSPITAL (OUTPATIENT)
Dept: URBAN - METROPOLITAN AREA HOSPITAL 95 | Facility: HOSPITAL | Age: 43
End: 2023-03-15
Payer: COMMERCIAL

## 2023-03-15 DIAGNOSIS — R94.5 ABNORMAL RESULTS OF LIVER FUNCTION STUDIES: ICD-10-CM

## 2023-03-15 DIAGNOSIS — F10.121 ALCOHOL ABUSE WITH INTOXICATION DELIRIUM: ICD-10-CM

## 2023-03-15 DIAGNOSIS — K85.90 ACUTE PANCREATITIS WITHOUT NECROSIS OR INFECTION, UNSPECIFIE: ICD-10-CM

## 2023-03-15 DIAGNOSIS — N17.9 ACUTE KIDNEY FAILURE, UNSPECIFIED: ICD-10-CM

## 2023-03-15 PROCEDURE — 99231 SBSQ HOSP IP/OBS SF/LOW 25: CPT | Performed by: INTERNAL MEDICINE

## 2023-03-16 ENCOUNTER — INPATIENT HOSPITAL (OUTPATIENT)
Dept: URBAN - METROPOLITAN AREA HOSPITAL 95 | Facility: HOSPITAL | Age: 43
End: 2023-03-16

## 2023-03-16 DIAGNOSIS — R94.5 ABNORMAL RESULTS OF LIVER FUNCTION STUDIES: ICD-10-CM

## 2023-03-16 DIAGNOSIS — K85.90 ACUTE PANCREATITIS WITHOUT NECROSIS OR INFECTION, UNSPECIFIE: ICD-10-CM

## 2023-03-16 DIAGNOSIS — J96.90 RESPIRATORY FAILURE, UNSPECIFIED, UNSPECIFIED WHETHER WITH H: ICD-10-CM

## 2023-03-16 DIAGNOSIS — N17.9 ACUTE KIDNEY FAILURE, UNSPECIFIED: ICD-10-CM

## 2023-03-16 DIAGNOSIS — F10.121 ALCOHOL ABUSE WITH INTOXICATION DELIRIUM: ICD-10-CM

## 2023-03-16 PROCEDURE — 99231 SBSQ HOSP IP/OBS SF/LOW 25: CPT | Performed by: INTERNAL MEDICINE

## 2023-03-17 ENCOUNTER — INPATIENT HOSPITAL (OUTPATIENT)
Dept: URBAN - METROPOLITAN AREA HOSPITAL 95 | Facility: HOSPITAL | Age: 43
End: 2023-03-17
Payer: COMMERCIAL

## 2023-03-17 DIAGNOSIS — R94.5 ABNORMAL RESULTS OF LIVER FUNCTION STUDIES: ICD-10-CM

## 2023-03-17 DIAGNOSIS — N17.9 ACUTE KIDNEY FAILURE, UNSPECIFIED: ICD-10-CM

## 2023-03-17 DIAGNOSIS — F10.121 ALCOHOL ABUSE WITH INTOXICATION DELIRIUM: ICD-10-CM

## 2023-03-17 DIAGNOSIS — K85.90 ACUTE PANCREATITIS WITHOUT NECROSIS OR INFECTION, UNSPECIFIE: ICD-10-CM

## 2023-03-17 DIAGNOSIS — J96.90 RESPIRATORY FAILURE, UNSPECIFIED, UNSPECIFIED WHETHER WITH H: ICD-10-CM

## 2023-03-17 PROCEDURE — 99232 SBSQ HOSP IP/OBS MODERATE 35: CPT | Performed by: INTERNAL MEDICINE

## 2023-03-18 ENCOUNTER — INPATIENT HOSPITAL (OUTPATIENT)
Dept: URBAN - METROPOLITAN AREA HOSPITAL 95 | Facility: HOSPITAL | Age: 43
End: 2023-03-18

## 2023-03-18 DIAGNOSIS — K85.90 ACUTE PANCREATITIS WITHOUT NECROSIS OR INFECTION, UNSPECIFIE: ICD-10-CM

## 2023-03-18 DIAGNOSIS — N17.9 ACUTE KIDNEY FAILURE, UNSPECIFIED: ICD-10-CM

## 2023-03-18 DIAGNOSIS — F10.121 ALCOHOL ABUSE WITH INTOXICATION DELIRIUM: ICD-10-CM

## 2023-03-18 DIAGNOSIS — J96.90 RESPIRATORY FAILURE, UNSPECIFIED, UNSPECIFIED WHETHER WITH H: ICD-10-CM

## 2023-03-18 PROCEDURE — 99231 SBSQ HOSP IP/OBS SF/LOW 25: CPT | Performed by: INTERNAL MEDICINE

## 2023-03-19 ENCOUNTER — INPATIENT HOSPITAL (OUTPATIENT)
Dept: URBAN - METROPOLITAN AREA HOSPITAL 95 | Facility: HOSPITAL | Age: 43
End: 2023-03-19

## 2023-03-19 DIAGNOSIS — J96.90 RESPIRATORY FAILURE, UNSPECIFIED, UNSPECIFIED WHETHER WITH H: ICD-10-CM

## 2023-03-19 DIAGNOSIS — F10.121 ALCOHOL ABUSE WITH INTOXICATION DELIRIUM: ICD-10-CM

## 2023-03-19 DIAGNOSIS — N17.9 ACUTE KIDNEY FAILURE, UNSPECIFIED: ICD-10-CM

## 2023-03-19 DIAGNOSIS — R94.5 ABNORMAL RESULTS OF LIVER FUNCTION STUDIES: ICD-10-CM

## 2023-03-19 DIAGNOSIS — K85.90 ACUTE PANCREATITIS WITHOUT NECROSIS OR INFECTION, UNSPECIFIE: ICD-10-CM

## 2023-03-19 PROCEDURE — 99231 SBSQ HOSP IP/OBS SF/LOW 25: CPT | Performed by: INTERNAL MEDICINE

## 2023-03-20 ENCOUNTER — INPATIENT HOSPITAL (OUTPATIENT)
Dept: URBAN - METROPOLITAN AREA HOSPITAL 95 | Facility: HOSPITAL | Age: 43
End: 2023-03-20
Payer: COMMERCIAL

## 2023-03-20 DIAGNOSIS — F10.121 ALCOHOL ABUSE WITH INTOXICATION DELIRIUM: ICD-10-CM

## 2023-03-20 DIAGNOSIS — E46 UNSPECIFIED PROTEIN-CALORIE MALNUTRITION: ICD-10-CM

## 2023-03-20 DIAGNOSIS — N17.9 ACUTE KIDNEY FAILURE, UNSPECIFIED: ICD-10-CM

## 2023-03-20 DIAGNOSIS — K85.90 ACUTE PANCREATITIS WITHOUT NECROSIS OR INFECTION, UNSPECIFIE: ICD-10-CM

## 2023-03-20 DIAGNOSIS — J96.90 RESPIRATORY FAILURE, UNSPECIFIED, UNSPECIFIED WHETHER WITH H: ICD-10-CM

## 2023-03-20 PROCEDURE — 99232 SBSQ HOSP IP/OBS MODERATE 35: CPT | Performed by: INTERNAL MEDICINE

## 2023-03-21 ENCOUNTER — INPATIENT HOSPITAL (OUTPATIENT)
Dept: URBAN - METROPOLITAN AREA HOSPITAL 95 | Facility: HOSPITAL | Age: 43
End: 2023-03-21

## 2023-03-21 DIAGNOSIS — K85.90 ACUTE PANCREATITIS WITHOUT NECROSIS OR INFECTION, UNSPECIFIE: ICD-10-CM

## 2023-03-21 DIAGNOSIS — J96.90 RESPIRATORY FAILURE, UNSPECIFIED, UNSPECIFIED WHETHER WITH H: ICD-10-CM

## 2023-03-21 DIAGNOSIS — N17.9 ACUTE KIDNEY FAILURE, UNSPECIFIED: ICD-10-CM

## 2023-03-21 DIAGNOSIS — F10.121 ALCOHOL ABUSE WITH INTOXICATION DELIRIUM: ICD-10-CM

## 2023-03-21 DIAGNOSIS — E46 UNSPECIFIED PROTEIN-CALORIE MALNUTRITION: ICD-10-CM

## 2023-03-21 PROCEDURE — 99232 SBSQ HOSP IP/OBS MODERATE 35: CPT | Performed by: INTERNAL MEDICINE

## 2023-03-22 ENCOUNTER — INPATIENT HOSPITAL (OUTPATIENT)
Dept: URBAN - METROPOLITAN AREA HOSPITAL 95 | Facility: HOSPITAL | Age: 43
End: 2023-03-22
Payer: COMMERCIAL

## 2023-03-22 DIAGNOSIS — F10.121 ALCOHOL ABUSE WITH INTOXICATION DELIRIUM: ICD-10-CM

## 2023-03-22 DIAGNOSIS — J96.90 RESPIRATORY FAILURE, UNSPECIFIED, UNSPECIFIED WHETHER WITH H: ICD-10-CM

## 2023-03-22 DIAGNOSIS — K85.90 ACUTE PANCREATITIS WITHOUT NECROSIS OR INFECTION, UNSPECIFIE: ICD-10-CM

## 2023-03-22 DIAGNOSIS — N17.9 ACUTE KIDNEY FAILURE, UNSPECIFIED: ICD-10-CM

## 2023-03-22 PROCEDURE — 99232 SBSQ HOSP IP/OBS MODERATE 35: CPT | Performed by: INTERNAL MEDICINE

## 2023-03-23 ENCOUNTER — INPATIENT HOSPITAL (OUTPATIENT)
Dept: URBAN - METROPOLITAN AREA HOSPITAL 95 | Facility: HOSPITAL | Age: 43
End: 2023-03-23

## 2023-03-23 DIAGNOSIS — N17.9 ACUTE KIDNEY FAILURE, UNSPECIFIED: ICD-10-CM

## 2023-03-23 DIAGNOSIS — J96.90 RESPIRATORY FAILURE, UNSPECIFIED, UNSPECIFIED WHETHER WITH H: ICD-10-CM

## 2023-03-23 DIAGNOSIS — R19.5 OTHER FECAL ABNORMALITIES: ICD-10-CM

## 2023-03-23 DIAGNOSIS — F10.121 ALCOHOL ABUSE WITH INTOXICATION DELIRIUM: ICD-10-CM

## 2023-03-23 DIAGNOSIS — K85.92 ACUTE PANCREATITIS WITH INFECTED NECROSIS, UNSPECIFIED: ICD-10-CM

## 2023-03-23 PROCEDURE — 99232 SBSQ HOSP IP/OBS MODERATE 35: CPT | Performed by: INTERNAL MEDICINE

## 2023-03-24 ENCOUNTER — INPATIENT HOSPITAL (OUTPATIENT)
Dept: URBAN - METROPOLITAN AREA HOSPITAL 95 | Facility: HOSPITAL | Age: 43
End: 2023-03-24

## 2023-03-24 DIAGNOSIS — K85.90 ACUTE PANCREATITIS WITHOUT NECROSIS OR INFECTION, UNSPECIFIE: ICD-10-CM

## 2023-03-24 DIAGNOSIS — F10.121 ALCOHOL ABUSE WITH INTOXICATION DELIRIUM: ICD-10-CM

## 2023-03-24 DIAGNOSIS — J96.90 RESPIRATORY FAILURE, UNSPECIFIED, UNSPECIFIED WHETHER WITH H: ICD-10-CM

## 2023-03-24 DIAGNOSIS — R50.9 FEVER, UNSPECIFIED: ICD-10-CM

## 2023-03-24 DIAGNOSIS — N17.9 ACUTE KIDNEY FAILURE, UNSPECIFIED: ICD-10-CM

## 2023-03-24 PROCEDURE — 99232 SBSQ HOSP IP/OBS MODERATE 35: CPT | Performed by: INTERNAL MEDICINE

## 2023-03-25 ENCOUNTER — INPATIENT HOSPITAL (OUTPATIENT)
Dept: URBAN - METROPOLITAN AREA HOSPITAL 95 | Facility: HOSPITAL | Age: 43
End: 2023-03-25

## 2023-03-25 DIAGNOSIS — N17.9 ACUTE KIDNEY FAILURE, UNSPECIFIED: ICD-10-CM

## 2023-03-25 DIAGNOSIS — K85.90 ACUTE PANCREATITIS WITHOUT NECROSIS OR INFECTION, UNSPECIFIE: ICD-10-CM

## 2023-03-25 DIAGNOSIS — J96.90 RESPIRATORY FAILURE, UNSPECIFIED, UNSPECIFIED WHETHER WITH H: ICD-10-CM

## 2023-03-25 DIAGNOSIS — F10.121 ALCOHOL ABUSE WITH INTOXICATION DELIRIUM: ICD-10-CM

## 2023-03-25 DIAGNOSIS — R94.5 ABNORMAL RESULTS OF LIVER FUNCTION STUDIES: ICD-10-CM

## 2023-03-25 PROCEDURE — 99231 SBSQ HOSP IP/OBS SF/LOW 25: CPT | Performed by: INTERNAL MEDICINE

## 2023-03-26 ENCOUNTER — INPATIENT HOSPITAL (OUTPATIENT)
Dept: URBAN - METROPOLITAN AREA HOSPITAL 95 | Facility: HOSPITAL | Age: 43
End: 2023-03-26
Payer: COMMERCIAL

## 2023-03-26 DIAGNOSIS — F10.121 ALCOHOL ABUSE WITH INTOXICATION DELIRIUM: ICD-10-CM

## 2023-03-26 DIAGNOSIS — J96.90 RESPIRATORY FAILURE, UNSPECIFIED, UNSPECIFIED WHETHER WITH H: ICD-10-CM

## 2023-03-26 DIAGNOSIS — K85.90 ACUTE PANCREATITIS WITHOUT NECROSIS OR INFECTION, UNSPECIFIE: ICD-10-CM

## 2023-03-26 DIAGNOSIS — N17.9 ACUTE KIDNEY FAILURE, UNSPECIFIED: ICD-10-CM

## 2023-03-26 PROCEDURE — 99231 SBSQ HOSP IP/OBS SF/LOW 25: CPT | Performed by: INTERNAL MEDICINE

## 2023-03-27 ENCOUNTER — INPATIENT HOSPITAL (OUTPATIENT)
Dept: URBAN - METROPOLITAN AREA HOSPITAL 95 | Facility: HOSPITAL | Age: 43
End: 2023-03-27

## 2023-03-27 DIAGNOSIS — F10.121 ALCOHOL ABUSE WITH INTOXICATION DELIRIUM: ICD-10-CM

## 2023-03-27 DIAGNOSIS — N17.9 ACUTE KIDNEY FAILURE, UNSPECIFIED: ICD-10-CM

## 2023-03-27 DIAGNOSIS — J96.90 RESPIRATORY FAILURE, UNSPECIFIED, UNSPECIFIED WHETHER WITH H: ICD-10-CM

## 2023-03-27 DIAGNOSIS — D64.9 ANEMIA, UNSPECIFIED: ICD-10-CM

## 2023-03-27 DIAGNOSIS — K85.90 ACUTE PANCREATITIS WITHOUT NECROSIS OR INFECTION, UNSPECIFIE: ICD-10-CM

## 2023-03-27 PROCEDURE — 99232 SBSQ HOSP IP/OBS MODERATE 35: CPT | Performed by: INTERNAL MEDICINE

## 2023-03-28 ENCOUNTER — INPATIENT HOSPITAL (OUTPATIENT)
Dept: URBAN - METROPOLITAN AREA HOSPITAL 95 | Facility: HOSPITAL | Age: 43
End: 2023-03-28
Payer: COMMERCIAL

## 2023-03-28 DIAGNOSIS — N17.9 ACUTE KIDNEY FAILURE, UNSPECIFIED: ICD-10-CM

## 2023-03-28 DIAGNOSIS — K85.90 ACUTE PANCREATITIS WITHOUT NECROSIS OR INFECTION, UNSPECIFIE: ICD-10-CM

## 2023-03-28 DIAGNOSIS — R19.5 OTHER FECAL ABNORMALITIES: ICD-10-CM

## 2023-03-28 DIAGNOSIS — F10.121 ALCOHOL ABUSE WITH INTOXICATION DELIRIUM: ICD-10-CM

## 2023-03-28 DIAGNOSIS — J96.90 RESPIRATORY FAILURE, UNSPECIFIED, UNSPECIFIED WHETHER WITH H: ICD-10-CM

## 2023-03-28 PROCEDURE — 99232 SBSQ HOSP IP/OBS MODERATE 35: CPT | Performed by: INTERNAL MEDICINE

## 2023-03-29 ENCOUNTER — INPATIENT HOSPITAL (OUTPATIENT)
Dept: URBAN - METROPOLITAN AREA HOSPITAL 95 | Facility: HOSPITAL | Age: 43
End: 2023-03-29
Payer: COMMERCIAL

## 2023-03-29 DIAGNOSIS — R94.5 ABNORMAL RESULTS OF LIVER FUNCTION STUDIES: ICD-10-CM

## 2023-03-29 DIAGNOSIS — K85.92 ACUTE PANCREATITIS WITH INFECTED NECROSIS, UNSPECIFIED: ICD-10-CM

## 2023-03-29 PROCEDURE — 99232 SBSQ HOSP IP/OBS MODERATE 35: CPT | Performed by: INTERNAL MEDICINE

## 2023-03-30 ENCOUNTER — INPATIENT HOSPITAL (OUTPATIENT)
Dept: URBAN - METROPOLITAN AREA HOSPITAL 95 | Facility: HOSPITAL | Age: 43
End: 2023-03-30
Payer: COMMERCIAL

## 2023-03-30 DIAGNOSIS — F10.121 ALCOHOL ABUSE WITH INTOXICATION DELIRIUM: ICD-10-CM

## 2023-03-30 DIAGNOSIS — K85.92 ACUTE PANCREATITIS WITH INFECTED NECROSIS, UNSPECIFIED: ICD-10-CM

## 2023-03-30 DIAGNOSIS — N17.9 ACUTE KIDNEY FAILURE, UNSPECIFIED: ICD-10-CM

## 2023-03-30 DIAGNOSIS — A04.72 ENTEROCOLITIS DUE TO CLOSTRIDIUM DIFFICILE, NOT SPECIFIED AS: ICD-10-CM

## 2023-03-30 DIAGNOSIS — J96.90 RESPIRATORY FAILURE, UNSPECIFIED, UNSPECIFIED WHETHER WITH H: ICD-10-CM

## 2023-03-30 PROCEDURE — 99232 SBSQ HOSP IP/OBS MODERATE 35: CPT | Performed by: INTERNAL MEDICINE

## 2023-03-31 ENCOUNTER — INPATIENT HOSPITAL (OUTPATIENT)
Dept: URBAN - METROPOLITAN AREA HOSPITAL 95 | Facility: HOSPITAL | Age: 43
End: 2023-03-31
Payer: COMMERCIAL

## 2023-03-31 DIAGNOSIS — A04.72 ENTEROCOLITIS DUE TO CLOSTRIDIUM DIFFICILE, NOT SPECIFIED AS: ICD-10-CM

## 2023-03-31 DIAGNOSIS — N17.9 ACUTE KIDNEY FAILURE, UNSPECIFIED: ICD-10-CM

## 2023-03-31 DIAGNOSIS — J96.90 RESPIRATORY FAILURE, UNSPECIFIED, UNSPECIFIED WHETHER WITH H: ICD-10-CM

## 2023-03-31 DIAGNOSIS — R94.5 ABNORMAL RESULTS OF LIVER FUNCTION STUDIES: ICD-10-CM

## 2023-03-31 DIAGNOSIS — F10.121 ALCOHOL ABUSE WITH INTOXICATION DELIRIUM: ICD-10-CM

## 2023-03-31 PROCEDURE — 99232 SBSQ HOSP IP/OBS MODERATE 35: CPT | Performed by: INTERNAL MEDICINE

## 2023-04-03 ENCOUNTER — INPATIENT HOSPITAL (OUTPATIENT)
Dept: URBAN - METROPOLITAN AREA HOSPITAL 95 | Facility: HOSPITAL | Age: 43
End: 2023-04-03
Payer: COMMERCIAL

## 2023-04-03 DIAGNOSIS — N17.9 ACUTE KIDNEY FAILURE, UNSPECIFIED: ICD-10-CM

## 2023-04-03 DIAGNOSIS — R94.5 ABNORMAL RESULTS OF LIVER FUNCTION STUDIES: ICD-10-CM

## 2023-04-03 DIAGNOSIS — K85.90 ACUTE PANCREATITIS WITHOUT NECROSIS OR INFECTION, UNSPECIFIE: ICD-10-CM

## 2023-04-03 DIAGNOSIS — F10.121 ALCOHOL ABUSE WITH INTOXICATION DELIRIUM: ICD-10-CM

## 2023-04-03 DIAGNOSIS — J96.90 RESPIRATORY FAILURE, UNSPECIFIED, UNSPECIFIED WHETHER WITH H: ICD-10-CM

## 2023-04-03 PROCEDURE — 99232 SBSQ HOSP IP/OBS MODERATE 35: CPT | Performed by: INTERNAL MEDICINE

## 2023-04-04 ENCOUNTER — INPATIENT HOSPITAL (OUTPATIENT)
Dept: URBAN - METROPOLITAN AREA HOSPITAL 95 | Facility: HOSPITAL | Age: 43
End: 2023-04-04
Payer: COMMERCIAL

## 2023-04-04 DIAGNOSIS — R94.5 ABNORMAL RESULTS OF LIVER FUNCTION STUDIES: ICD-10-CM

## 2023-04-04 DIAGNOSIS — N17.9 ACUTE KIDNEY FAILURE, UNSPECIFIED: ICD-10-CM

## 2023-04-04 DIAGNOSIS — J96.90 RESPIRATORY FAILURE, UNSPECIFIED, UNSPECIFIED WHETHER WITH H: ICD-10-CM

## 2023-04-04 DIAGNOSIS — F10.121 ALCOHOL ABUSE WITH INTOXICATION DELIRIUM: ICD-10-CM

## 2023-04-04 DIAGNOSIS — A04.72 ENTEROCOLITIS DUE TO CLOSTRIDIUM DIFFICILE, NOT SPECIFIED AS: ICD-10-CM

## 2023-04-04 DIAGNOSIS — K85.90 ACUTE PANCREATITIS WITHOUT NECROSIS OR INFECTION, UNSPECIFIE: ICD-10-CM

## 2023-04-04 PROCEDURE — 99232 SBSQ HOSP IP/OBS MODERATE 35: CPT | Performed by: INTERNAL MEDICINE

## 2023-04-05 ENCOUNTER — INPATIENT HOSPITAL (OUTPATIENT)
Dept: URBAN - METROPOLITAN AREA HOSPITAL 95 | Facility: HOSPITAL | Age: 43
End: 2023-04-05
Payer: COMMERCIAL

## 2023-04-05 DIAGNOSIS — R94.5 ABNORMAL RESULTS OF LIVER FUNCTION STUDIES: ICD-10-CM

## 2023-04-05 DIAGNOSIS — N17.9 ACUTE KIDNEY FAILURE, UNSPECIFIED: ICD-10-CM

## 2023-04-05 DIAGNOSIS — F10.121 ALCOHOL ABUSE WITH INTOXICATION DELIRIUM: ICD-10-CM

## 2023-04-05 DIAGNOSIS — K85.90 ACUTE PANCREATITIS WITHOUT NECROSIS OR INFECTION, UNSPECIFIE: ICD-10-CM

## 2023-04-05 DIAGNOSIS — J96.90 RESPIRATORY FAILURE, UNSPECIFIED, UNSPECIFIED WHETHER WITH H: ICD-10-CM

## 2023-04-05 DIAGNOSIS — A04.72 ENTEROCOLITIS DUE TO CLOSTRIDIUM DIFFICILE, NOT SPECIFIED AS: ICD-10-CM

## 2023-04-05 PROCEDURE — 99232 SBSQ HOSP IP/OBS MODERATE 35: CPT | Performed by: INTERNAL MEDICINE

## 2023-04-06 ENCOUNTER — INPATIENT HOSPITAL (OUTPATIENT)
Dept: URBAN - METROPOLITAN AREA HOSPITAL 95 | Facility: HOSPITAL | Age: 43
End: 2023-04-06
Payer: COMMERCIAL

## 2023-04-06 DIAGNOSIS — R94.5 ABNORMAL RESULTS OF LIVER FUNCTION STUDIES: ICD-10-CM

## 2023-04-06 DIAGNOSIS — N17.9 ACUTE KIDNEY FAILURE, UNSPECIFIED: ICD-10-CM

## 2023-04-06 DIAGNOSIS — F10.121 ALCOHOL ABUSE WITH INTOXICATION DELIRIUM: ICD-10-CM

## 2023-04-06 DIAGNOSIS — J96.90 RESPIRATORY FAILURE, UNSPECIFIED, UNSPECIFIED WHETHER WITH H: ICD-10-CM

## 2023-04-06 DIAGNOSIS — K85.90 ACUTE PANCREATITIS WITHOUT NECROSIS OR INFECTION, UNSPECIFIE: ICD-10-CM

## 2023-04-06 PROCEDURE — 99231 SBSQ HOSP IP/OBS SF/LOW 25: CPT | Performed by: INTERNAL MEDICINE

## 2023-04-07 ENCOUNTER — INPATIENT HOSPITAL (OUTPATIENT)
Dept: URBAN - METROPOLITAN AREA HOSPITAL 95 | Facility: HOSPITAL | Age: 43
End: 2023-04-07
Payer: COMMERCIAL

## 2023-04-07 DIAGNOSIS — A04.72 ENTEROCOLITIS DUE TO CLOSTRIDIUM DIFFICILE, NOT SPECIFIED AS: ICD-10-CM

## 2023-04-07 DIAGNOSIS — R94.5 ABNORMAL RESULTS OF LIVER FUNCTION STUDIES: ICD-10-CM

## 2023-04-07 DIAGNOSIS — J96.90 RESPIRATORY FAILURE, UNSPECIFIED, UNSPECIFIED WHETHER WITH H: ICD-10-CM

## 2023-04-07 DIAGNOSIS — F10.121 ALCOHOL ABUSE WITH INTOXICATION DELIRIUM: ICD-10-CM

## 2023-04-07 DIAGNOSIS — K85.90 ACUTE PANCREATITIS WITHOUT NECROSIS OR INFECTION, UNSPECIFIE: ICD-10-CM

## 2023-04-07 DIAGNOSIS — N17.9 ACUTE KIDNEY FAILURE, UNSPECIFIED: ICD-10-CM

## 2023-04-07 PROCEDURE — 99232 SBSQ HOSP IP/OBS MODERATE 35: CPT | Performed by: INTERNAL MEDICINE

## 2023-05-10 ENCOUNTER — OFFICE (OUTPATIENT)
Dept: URBAN - METROPOLITAN AREA CLINIC 9 | Facility: CLINIC | Age: 43
End: 2023-05-10
Payer: COMMERCIAL

## 2023-05-10 DIAGNOSIS — F10.121 ALCOHOL ABUSE WITH INTOXICATION DELIRIUM: ICD-10-CM

## 2023-05-10 DIAGNOSIS — K85.90 ACUTE PANCREATITIS WITHOUT NECROSIS OR INFECTION, UNSPECIFIE: ICD-10-CM

## 2023-05-10 DIAGNOSIS — K76.0 FATTY (CHANGE OF) LIVER, NOT ELSEWHERE CLASSIFIED: ICD-10-CM

## 2023-05-10 PROCEDURE — 99213 OFFICE O/P EST LOW 20 MIN: CPT

## 2023-05-10 PROCEDURE — 43762 RPLC GTUBE NO REVJ TRC: CPT
